# Patient Record
Sex: MALE | Race: ASIAN | NOT HISPANIC OR LATINO | Employment: STUDENT | URBAN - METROPOLITAN AREA
[De-identification: names, ages, dates, MRNs, and addresses within clinical notes are randomized per-mention and may not be internally consistent; named-entity substitution may affect disease eponyms.]

---
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
## 2020-12-28 ENCOUNTER — OFFICE VISIT (OUTPATIENT)
Dept: PEDIATRICS CLINIC | Age: 20
End: 2020-12-28
Payer: COMMERCIAL

## 2020-12-28 VITALS
RESPIRATION RATE: 16 BRPM | HEIGHT: 72 IN | HEART RATE: 72 BPM | DIASTOLIC BLOOD PRESSURE: 72 MMHG | WEIGHT: 182 LBS | SYSTOLIC BLOOD PRESSURE: 110 MMHG | TEMPERATURE: 97.9 F | BODY MASS INDEX: 24.65 KG/M2

## 2020-12-28 DIAGNOSIS — Z00.00 WELL ADULT HEALTH CHECK: Primary | ICD-10-CM

## 2020-12-28 PROCEDURE — 99395 PREV VISIT EST AGE 18-39: CPT | Performed by: PEDIATRICS

## 2020-12-28 PROCEDURE — 99173 VISUAL ACUITY SCREEN: CPT | Performed by: PEDIATRICS

## 2021-01-13 LAB
ALBUMIN SERPL-MCNC: 4.2 G/DL (ref 3.6–5.1)
ALBUMIN/GLOB SERPL: 1.6 (CALC) (ref 1–2.5)
ALP SERPL-CCNC: 69 U/L (ref 36–130)
ALT SERPL-CCNC: 22 U/L (ref 9–46)
AST SERPL-CCNC: 21 U/L (ref 10–40)
BASOPHILS # BLD AUTO: 72 CELLS/UL (ref 0–200)
BASOPHILS NFR BLD AUTO: 1.3 %
BILIRUB SERPL-MCNC: 1 MG/DL (ref 0.2–1.2)
BLASTS # BLD: NORMAL CELLS/UL
BLASTS NFR BLD MANUAL: NORMAL %
BUN SERPL-MCNC: 12 MG/DL (ref 7–25)
BUN/CREAT SERPL: NORMAL (CALC) (ref 6–22)
CALCIUM SERPL-MCNC: 9.3 MG/DL (ref 8.6–10.3)
CHLORIDE SERPL-SCNC: 103 MMOL/L (ref 98–110)
CHOLEST SERPL-MCNC: 173 MG/DL
CHOLEST/HDLC SERPL: 2.6 (CALC)
CO2 SERPL-SCNC: 28 MMOL/L (ref 20–32)
CREAT SERPL-MCNC: 0.91 MG/DL (ref 0.6–1.35)
EOSINOPHIL # BLD AUTO: 242 CELLS/UL (ref 15–500)
EOSINOPHIL NFR BLD AUTO: 4.4 %
ERYTHROCYTE [DISTWIDTH] IN BLOOD BY AUTOMATED COUNT: 12.2 % (ref 11–15)
GLOBULIN SER CALC-MCNC: 2.7 G/DL (CALC) (ref 1.9–3.7)
GLUCOSE SERPL-MCNC: 85 MG/DL (ref 65–99)
HCT VFR BLD AUTO: 44.3 % (ref 38.5–50)
HDLC SERPL-MCNC: 67 MG/DL
HGB BLD-MCNC: 14.6 G/DL (ref 13.2–17.1)
LDLC SERPL CALC-MCNC: 82 MG/DL (CALC)
LYMPHOCYTES # BLD AUTO: 1694 CELLS/UL (ref 850–3900)
LYMPHOCYTES NFR BLD AUTO: 30.8 %
MCH RBC QN AUTO: 28.8 PG (ref 27–33)
MCHC RBC AUTO-ENTMCNC: 33 G/DL (ref 32–36)
MCV RBC AUTO: 87.4 FL (ref 80–100)
METAMYELOCYTES # BLD: NORMAL CELLS/UL
METAMYELOCYTES NFR BLD MANUAL: NORMAL %
MONOCYTES # BLD AUTO: 567 CELLS/UL (ref 200–950)
MONOCYTES NFR BLD AUTO: 10.3 %
MYELOCYTES # BLD: NORMAL CELLS/UL
MYELOCYTES NFR BLD MANUAL: NORMAL %
NEUTROPHILS # BLD AUTO: 2926 CELLS/UL (ref 1500–7800)
NEUTROPHILS NFR BLD AUTO: 53.2 %
NEUTS BAND # BLD: NORMAL CELLS/UL (ref 0–750)
NEUTS BAND NFR BLD MANUAL: NORMAL %
NONHDLC SERPL-MCNC: 106 MG/DL (CALC)
NRBC # BLD: NORMAL CELLS/UL
NRBC BLD-RTO: NORMAL /100 WBC
PLATELET # BLD AUTO: 254 THOUSAND/UL (ref 140–400)
PMV BLD REES-ECKER: 10.5 FL (ref 7.5–12.5)
POTASSIUM SERPL-SCNC: 4 MMOL/L (ref 3.5–5.3)
PROMYELOCYTES # BLD: NORMAL CELLS/UL
PROMYELOCYTES NFR BLD MANUAL: NORMAL %
PROT SERPL-MCNC: 6.9 G/DL (ref 6.1–8.1)
RBC # BLD AUTO: 5.07 MILLION/UL (ref 4.2–5.8)
SERVICE CMNT-IMP: NORMAL
SL AMB EGFR AFRICAN AMERICAN: 140 ML/MIN/1.73M2
SL AMB EGFR NON AFRICAN AMERICAN: 121 ML/MIN/1.73M2
SODIUM SERPL-SCNC: 138 MMOL/L (ref 135–146)
TRIGL SERPL-MCNC: 137 MG/DL
VARIANT LYMPHS NFR BLD: NORMAL % (ref 0–10)
WBC # BLD AUTO: 5.5 THOUSAND/UL (ref 3.8–10.8)

## 2021-04-12 DIAGNOSIS — Z23 ENCOUNTER FOR IMMUNIZATION: ICD-10-CM

## 2021-05-24 ENCOUNTER — IMMUNIZATIONS (OUTPATIENT)
Dept: FAMILY MEDICINE CLINIC | Facility: HOSPITAL | Age: 21
End: 2021-05-24

## 2021-05-24 DIAGNOSIS — Z23 ENCOUNTER FOR IMMUNIZATION: Primary | ICD-10-CM

## 2021-05-24 PROCEDURE — 91300 SARS-COV-2 / COVID-19 MRNA VACCINE (PFIZER-BIONTECH) 30 MCG: CPT

## 2021-05-24 PROCEDURE — 0001A SARS-COV-2 / COVID-19 MRNA VACCINE (PFIZER-BIONTECH) 30 MCG: CPT

## 2021-12-21 DIAGNOSIS — Z20.822 EXPOSURE TO COVID-19 VIRUS: Primary | ICD-10-CM

## 2021-12-23 PROCEDURE — U0003 INFECTIOUS AGENT DETECTION BY NUCLEIC ACID (DNA OR RNA); SEVERE ACUTE RESPIRATORY SYNDROME CORONAVIRUS 2 (SARS-COV-2) (CORONAVIRUS DISEASE [COVID-19]), AMPLIFIED PROBE TECHNIQUE, MAKING USE OF HIGH THROUGHPUT TECHNOLOGIES AS DESCRIBED BY CMS-2020-01-R: HCPCS | Performed by: FAMILY MEDICINE

## 2021-12-23 PROCEDURE — U0005 INFEC AGEN DETEC AMPLI PROBE: HCPCS | Performed by: FAMILY MEDICINE

## 2022-03-10 ENCOUNTER — OFFICE VISIT (OUTPATIENT)
Dept: FAMILY MEDICINE CLINIC | Facility: CLINIC | Age: 22
End: 2022-03-10
Payer: COMMERCIAL

## 2022-03-10 VITALS
HEIGHT: 72 IN | RESPIRATION RATE: 16 BRPM | DIASTOLIC BLOOD PRESSURE: 68 MMHG | SYSTOLIC BLOOD PRESSURE: 116 MMHG | BODY MASS INDEX: 23.16 KG/M2 | WEIGHT: 171 LBS | OXYGEN SATURATION: 98 % | HEART RATE: 74 BPM

## 2022-03-10 DIAGNOSIS — Z23 NEED FOR PROPHYLACTIC VACCINATION AGAINST DIPHTHERIA AND TETANUS: ICD-10-CM

## 2022-03-10 DIAGNOSIS — I47.1 SVT (SUPRAVENTRICULAR TACHYCARDIA) (HCC): ICD-10-CM

## 2022-03-10 DIAGNOSIS — Z00.00 PHYSICAL EXAM, ANNUAL: Primary | ICD-10-CM

## 2022-03-10 PROCEDURE — 90715 TDAP VACCINE 7 YRS/> IM: CPT | Performed by: FAMILY MEDICINE

## 2022-03-10 PROCEDURE — 99395 PREV VISIT EST AGE 18-39: CPT | Performed by: FAMILY MEDICINE

## 2022-03-10 PROCEDURE — 3725F SCREEN DEPRESSION PERFORMED: CPT | Performed by: FAMILY MEDICINE

## 2022-03-10 PROCEDURE — 3008F BODY MASS INDEX DOCD: CPT | Performed by: FAMILY MEDICINE

## 2022-03-10 PROCEDURE — 90471 IMMUNIZATION ADMIN: CPT | Performed by: FAMILY MEDICINE

## 2022-03-10 NOTE — PROGRESS NOTES
Subjective:      Patient ID: Jesusita Nicole is a 24 y o  male  75-year-old male presents for annual physical examination  Patient does have history of SVT  He recently saw pediatric cardiologist in follow-up  They are contemplating going to see electrophysiologist and having ablation performed  He does occasionally developed episodes which she does note when he is jumping  When this occurs he is able to feel it and he will perform Valsalva maneuver and SVT will break  Juan year at [employer]  No other complaints or concerns      No past medical history on file  Family History   Problem Relation Age of Onset    Stroke Other     Heart attack Other     Rectal cancer Other        No past surgical history on file  reports that he has never smoked  He has never used smokeless tobacco  He reports that he does not drink alcohol and does not use drugs  Current Outpatient Medications:     atenolol (TENORMIN) 25 mg tablet, Take 1 tablet by mouth daily, Disp: , Rfl:     triamcinolone (KENALOG) 0 1 % cream, Apply topically daily For 1 week (Patient not taking: Reported on 3/10/2022 ), Disp: 30 g, Rfl: 0    The following portions of the patient's history were reviewed and updated as appropriate: allergies, current medications, past family history, past medical history, past social history, past surgical history and problem list     Review of Systems   Constitutional: Negative  HENT: Negative  Eyes: Negative  Respiratory: Negative  Cardiovascular: Positive for palpitations (Sporadic episodes of SVT)  Gastrointestinal: Negative  Endocrine: Negative  Genitourinary: Negative  Musculoskeletal: Negative  Skin: Negative  Allergic/Immunologic: Negative  Neurological: Negative  Hematological: Negative  Psychiatric/Behavioral: Negative  All other systems reviewed and are negative            Objective:    /68   Pulse 74   Resp 16   Ht 6' (1 829 m) Wt 77 6 kg (171 lb)   SpO2 98%   BMI 23 19 kg/m²      Physical Exam  Vitals and nursing note reviewed  Constitutional:       General: He is not in acute distress  Appearance: Normal appearance  He is well-developed and normal weight  He is not ill-appearing  HENT:      Head: Normocephalic and atraumatic  Right Ear: Tympanic membrane, ear canal and external ear normal       Left Ear: Tympanic membrane, ear canal and external ear normal    Eyes:      Extraocular Movements: Extraocular movements intact  Conjunctiva/sclera: Conjunctivae normal       Pupils: Pupils are equal, round, and reactive to light  Cardiovascular:      Rate and Rhythm: Normal rate and regular rhythm  Pulses: Normal pulses  Heart sounds: Normal heart sounds  No murmur heard  Pulmonary:      Effort: Pulmonary effort is normal       Breath sounds: Normal breath sounds  Abdominal:      General: Abdomen is flat  Bowel sounds are normal       Palpations: Abdomen is soft  Hernia: No hernia is present  Genitourinary:     Penis: Normal        Testes: Normal    Musculoskeletal:         General: Normal range of motion  Cervical back: Normal range of motion and neck supple  Skin:     General: Skin is warm and dry  Neurological:      General: No focal deficit present  Mental Status: He is alert and oriented to person, place, and time  Psychiatric:         Mood and Affect: Mood normal          Behavior: Behavior normal          Thought Content: Thought content normal          Judgment: Judgment normal            No results found for this or any previous visit (from the past 1008 hour(s))  Assessment/Plan:    SVT (supraventricular tachycardia) [facility])  Patient did recently see his pediatric cardiologist   Pediatric cardiologist had discussed with patient and his family about performing ablation  Patient does gets sporadic episodes of SVT which he is able to break on his own with Valsalva maneuver  He does take atenolol on a daily basis    I did print off information for electrophysiologist here at [facility], Dr Dereje Baeza    Physical exam, annual  Annual physical examination performed    -as cell booster provided    -patient did have blood work performed in 2021 which included his cholesterol which was perfectly normal    Need for prophylactic vaccination against diphtheria and tetanus  Adacel provided here in the office          Problem List Items Addressed This Visit        Cardiovascular and Mediastinum    SVT (supraventricular tachycardia) [facility])     Patient did recently see his pediatric cardiologist   Pediatric cardiologist had discussed with patient and his family about performing ablation  Patient does gets sporadic episodes of SVT which he is able to break on his own with Valsalva maneuver    He does take atenolol on a daily basis    I did print off information for electrophysiologist here at [facility], Dr Dereje Baeza            Other    Need for prophylactic vaccination against diphtheria and tetanus     Adacel provided here in the office         Relevant Orders    TDAP VACCINE GREATER THAN OR EQUAL TO 6YO IM (Completed)    Physical exam, annual - Primary     Annual physical examination performed    -as cell booster provided    -patient did have blood work performed in 2021 which included his cholesterol which was perfectly normal

## 2022-03-10 NOTE — ASSESSMENT & PLAN NOTE
Patient did recently see his pediatric cardiologist   Pediatric cardiologist had discussed with patient and his family about performing ablation  Patient does gets sporadic episodes of SVT which he is able to break on his own with Valsalva maneuver    He does take atenolol on a daily basis    I did print off information for electrophysiologist here at [address], Dr Rey Quintero

## 2022-03-10 NOTE — ASSESSMENT & PLAN NOTE
Annual physical examination performed    -as cell booster provided    -patient did have blood work performed in 2021 which included his cholesterol which was perfectly normal

## 2023-01-03 ENCOUNTER — OFFICE VISIT (OUTPATIENT)
Dept: FAMILY MEDICINE CLINIC | Facility: CLINIC | Age: 23
End: 2023-01-03

## 2023-01-03 VITALS
WEIGHT: 177 LBS | OXYGEN SATURATION: 97 % | RESPIRATION RATE: 16 BRPM | SYSTOLIC BLOOD PRESSURE: 124 MMHG | HEART RATE: 78 BPM | DIASTOLIC BLOOD PRESSURE: 68 MMHG | HEIGHT: 72 IN | BODY MASS INDEX: 23.98 KG/M2 | TEMPERATURE: 97.8 F

## 2023-01-03 DIAGNOSIS — I47.1 SVT (SUPRAVENTRICULAR TACHYCARDIA) (HCC): ICD-10-CM

## 2023-01-03 DIAGNOSIS — J20.8 ACUTE BRONCHITIS DUE TO OTHER SPECIFIED ORGANISMS: Primary | ICD-10-CM

## 2023-01-03 PROBLEM — R06.2 EXPIRATORY WHEEZING: Status: ACTIVE | Noted: 2023-01-03

## 2023-01-03 RX ORDER — AZITHROMYCIN 250 MG/1
TABLET, FILM COATED ORAL
Qty: 6 TABLET | Refills: 0 | Status: SHIPPED | OUTPATIENT
Start: 2023-01-03 | End: 2023-01-07

## 2023-01-03 RX ORDER — ALBUTEROL SULFATE 90 UG/1
2 AEROSOL, METERED RESPIRATORY (INHALATION) EVERY 6 HOURS PRN
Qty: 18 G | Refills: 0 | Status: SHIPPED | OUTPATIENT
Start: 2023-01-03

## 2023-01-03 RX ORDER — PREDNISONE 20 MG/1
40 TABLET ORAL DAILY
Qty: 10 TABLET | Refills: 0 | Status: SHIPPED | OUTPATIENT
Start: 2023-01-03 | End: 2023-01-08

## 2023-01-03 NOTE — ASSESSMENT & PLAN NOTE
Patient has seen pediatric cardiologist   They have had discussions about performing ablation  He is able to break his episodes of SVT on his own with Valsalva maneuver    He does take atenolol on a daily basis

## 2023-01-03 NOTE — ASSESSMENT & PLAN NOTE
Likely had a viral bronchitis however the [facility] at the end of the week    -Patient will be treated with a course of Zithromax as well as pulse course prednisone 40 mg daily    -I did also prescribe albuterol inhaler    I did make patient aware on how to use an inhaler and I did warn him that there is possibility that this could precipitate episodes of SVT however he is on beta-blocker

## 2023-01-03 NOTE — PROGRESS NOTES
Subjective:      Patient ID: Lloyd Briggs is a 25 y o  male  57-year-old male presents complaining of a 2-week history of chest congestion, cough, wheezing  Cough is productive of greenish sputum  Patient has been taking over-the-counter Mucinex  He thinks that he may have had a fever for 1 day  No COVID-19 testing  Did not seek medical care anywhere else  States that his symptoms started after he got on break from the [address]  He will be returning back to [employer] [employer] [facility]  for classes starting this weekend      No past medical history on file  Family History   Problem Relation Age of Onset   • Stroke Other    • Heart attack Other    • Rectal cancer Other        No past surgical history on file  reports that he has never smoked  He has never used smokeless tobacco  He reports that he does not drink alcohol and does not use drugs  Current Outpatient Medications:   •  albuterol (Ventolin HFA) 90 mcg/act inhaler, Inhale 2 puffs every 6 (six) hours as needed for wheezing, Disp: 18 g, Rfl: 0  •  atenolol (TENORMIN) 25 mg tablet, Take 1 tablet by mouth daily, Disp: , Rfl:   •  azithromycin (ZITHROMAX) 250 mg tablet, Take 2 tablets today then 1 tablet daily x 4 days, Disp: 6 tablet, Rfl: 0  •  predniSONE 20 mg tablet, Take 2 tablets (40 mg total) by mouth daily for 5 days, Disp: 10 tablet, Rfl: 0  •  triamcinolone (KENALOG) 0 1 % cream, Apply topically daily For 1 week (Patient not taking: Reported on 3/10/2022), Disp: 30 g, Rfl: 0    The following portions of the patient's history were reviewed and updated as appropriate: allergies, current medications, past family history, past medical history, past social history, past surgical history and problem list     Review of Systems   Constitutional: Negative  Negative for chills, fatigue and fever  HENT: Negative for congestion and sore throat  Respiratory: Positive for cough and wheezing   Negative for choking, chest tightness, shortness of breath and stridor  Cardiovascular: Negative  Gastrointestinal: Negative  Objective:    /68   Pulse 78   Temp 97 8 °F (36 6 °C) (Tympanic Core)   Resp 16   Ht 6' (1 829 m)   Wt 80 3 kg (177 lb)   SpO2 97%   BMI 24 01 kg/m²      Physical Exam  Vitals and nursing note reviewed  Constitutional:       General: He is not in acute distress  Appearance: Normal appearance  He is well-developed and normal weight  He is not ill-appearing or toxic-appearing  HENT:      Head: Normocephalic and atraumatic  Nose: Mucosal edema and congestion present  No rhinorrhea  Cardiovascular:      Rate and Rhythm: Normal rate and regular rhythm  Heart sounds: Normal heart sounds  Pulmonary:      Effort: Pulmonary effort is normal       Breath sounds: Wheezing ( End expiratory) and rhonchi present  Musculoskeletal:      Cervical back: Normal range of motion and neck supple  Skin:     General: Skin is warm and dry  Neurological:      Mental Status: He is alert  No results found for this or any previous visit (from the past 1008 hour(s))  Assessment/Plan:    SVT (supraventricular tachycardia) [facility])  Patient has seen pediatric cardiologist   They have had discussions about performing ablation  He is able to break his episodes of SVT on his own with Valsalva maneuver  He does take atenolol on a daily basis    Acute bronchitis due to other specified organisms  Likely had a viral bronchitis however the patient will be returning to [place] which is 6 hours away at the end of the week    -Patient will be treated with a course of Zithromax as well as pulse course prednisone 40 mg daily    -I did also prescribe albuterol inhaler    I did make patient aware on how to use an inhaler and I did warn him that there is possibility that this could precipitate episodes of SVT however he is on beta-blocker          Problem List Items Addressed This Visit        Respiratory    Acute bronchitis due to other specified organisms - Primary     Likely had a viral bronchitis however the patient will be returning to [facility]  which is 6 hours away at the end of the week    -Patient will be treated with a course of Zithromax as well as pulse course prednisone 40 mg daily    -I did also prescribe albuterol inhaler  I did make patient aware on how to use an inhaler and I did warn him that there is possibility that this could precipitate episodes of SVT however he is on beta-blocker         Relevant Medications    azithromycin (ZITHROMAX) 250 mg tablet    predniSONE 20 mg tablet    albuterol (Ventolin HFA) 90 mcg/act inhaler       Cardiovascular and Mediastinum    SVT (supraventricular tachycardia) [facility])     Patient has seen pediatric cardiologist   They have had discussions about performing ablation  He is able to break his episodes of SVT on his own with Valsalva maneuver    He does take atenolol on a daily basis

## 2023-03-04 PROBLEM — J20.8 ACUTE BRONCHITIS DUE TO OTHER SPECIFIED ORGANISMS: Status: RESOLVED | Noted: 2023-01-03 | Resolved: 2023-03-04

## 2023-12-11 ENCOUNTER — RA CDI HCC (OUTPATIENT)
Dept: OTHER | Facility: HOSPITAL | Age: 23
End: 2023-12-11

## 2023-12-11 NOTE — PROGRESS NOTES
[address] [facility] coding opportunities       Chart reviewed, no opportunity found: CHART REVIEWED, NO OPPORTUNITY FOUND        Patients Insurance        Commercial Insurance: Guy Villafuerte

## 2023-12-18 ENCOUNTER — OFFICE VISIT (OUTPATIENT)
Dept: FAMILY MEDICINE CLINIC | Facility: CLINIC | Age: 23
End: 2023-12-18
Payer: COMMERCIAL

## 2023-12-18 VITALS
SYSTOLIC BLOOD PRESSURE: 124 MMHG | OXYGEN SATURATION: 97 % | HEART RATE: 78 BPM | HEIGHT: 72 IN | WEIGHT: 186 LBS | DIASTOLIC BLOOD PRESSURE: 62 MMHG | BODY MASS INDEX: 25.19 KG/M2

## 2023-12-18 DIAGNOSIS — I47.10 SVT (SUPRAVENTRICULAR TACHYCARDIA): ICD-10-CM

## 2023-12-18 DIAGNOSIS — Z98.890 HISTORY OF CARDIAC RADIOFREQUENCY ABLATION: ICD-10-CM

## 2023-12-18 DIAGNOSIS — Z00.00 PHYSICAL EXAM, ANNUAL: Primary | ICD-10-CM

## 2023-12-18 PROBLEM — R06.2 EXPIRATORY WHEEZING: Status: RESOLVED | Noted: 2023-01-03 | Resolved: 2023-12-18

## 2023-12-18 PROCEDURE — 99395 PREV VISIT EST AGE 18-39: CPT | Performed by: FAMILY MEDICINE

## 2023-12-18 NOTE — ASSESSMENT & PLAN NOTE
Patient had successful ablation for SVT at [facility].  He has followed up with pediatric cardiologist.  No further episodes.  He has been given precautions about watching his intake of caffeine as well as any type of stimulant medication such as pseudoephedrine

## 2023-12-18 NOTE — PROGRESS NOTES
Subjective:      Patient ID: Nash Grayson is a 23 y.o. male.    23-year-old male presents for annual physical examination.  Patient did undergo cardiac ablation in March.  No further episodes of SVT.  He did not follow-up with pediatric cardiologist in May.  He does avoid caffeine.  Still works out.  Working as         No past medical history on file.    Family History   Problem Relation Age of Onset   • Stroke Other    • Heart attack Other    • Rectal cancer Other        No past surgical history on file.     reports that he has never smoked. He has never used smokeless tobacco. He reports that he does not drink alcohol and does not use drugs.      Current Outpatient Medications:   •  albuterol (Ventolin HFA) 90 mcg/act inhaler, Inhale 2 puffs every 6 (six) hours as needed for wheezing (Patient not taking: Reported on 12/18/2023), Disp: 18 g, Rfl: 0  •  triamcinolone (KENALOG) 0.1 % cream, Apply topically daily For 1 week (Patient not taking: Reported on 3/10/2022), Disp: 30 g, Rfl: 0    The following portions of the patient's history were reviewed and updated as appropriate: allergies, current medications, past family history, past medical history, past social history, past surgical history and problem list.    Review of Systems   Constitutional: Negative.    HENT: Negative.     Eyes: Negative.    Respiratory: Negative.     Cardiovascular: Negative.    Gastrointestinal: Negative.    Endocrine: Negative.    Genitourinary: Negative.    Musculoskeletal: Negative.    Skin: Negative.    Allergic/Immunologic: Negative.    Neurological: Negative.    Hematological: Negative.    Psychiatric/Behavioral: Negative.     All other systems reviewed and are negative.          Objective:    /62   Pulse 78   Ht 6' (1.829 m)   Wt 84.4 kg (186 lb)   SpO2 97%   BMI 25.23 kg/m²      Physical Exam  Vitals and nursing note reviewed.   Constitutional:       General: He is not in acute distress.      Appearance: Normal appearance. He is well-developed and normal weight. He is not ill-appearing.   HENT:      Head: Normocephalic and atraumatic.      Right Ear: Tympanic membrane, ear canal and external ear normal.      Left Ear: Tympanic membrane, ear canal and external ear normal.      Nose: Nose normal.      Mouth/Throat:      Mouth: Mucous membranes are moist.   Eyes:      Extraocular Movements: Extraocular movements intact.      Conjunctiva/sclera: Conjunctivae normal.      Pupils: Pupils are equal, round, and reactive to light.   Cardiovascular:      Rate and Rhythm: Normal rate and regular rhythm.      Pulses: Normal pulses.      Heart sounds: Normal heart sounds. No murmur heard.  Pulmonary:      Effort: Pulmonary effort is normal.      Breath sounds: Normal breath sounds.   Abdominal:      General: Abdomen is flat. Bowel sounds are normal.      Palpations: Abdomen is soft.   Genitourinary:     Penis: Normal.       Testes: Normal.   Musculoskeletal:         General: Normal range of motion.      Cervical back: Normal range of motion and neck supple.   Skin:     General: Skin is warm and dry.   Neurological:      General: No focal deficit present.      Mental Status: He is alert and oriented to person, place, and time.   Psychiatric:         Mood and Affect: Mood normal.         Behavior: Behavior normal.         Thought Content: Thought content normal.         Judgment: Judgment normal.           No results found for this or any previous visit (from the past 1008 hour(s)).    Assessment/Plan:    History of cardiac radiofrequency ablation  Patient had successful ablation for SVT at [facility].  He has followed up with pediatric cardiologist.  No further episodes.  He has been given precautions about watching his intake of caffeine as well as any type of stimulant medication such as pseudoephedrine    SVT (supraventricular tachycardia) (HCC)  Patient is no longer taking beta-blocker.  He  underwent ablation at [facility].  He has followed up with pediatric cardiologist    Physical exam, annual  Annual physical examination performed    -Patient did have blood work prior to ablation in March which was essentially normal    -Would not recommend repeating labs until he is 25 years of age     - declines flu vaccination              Problem List Items Addressed This Visit        Cardiovascular and Mediastinum    SVT (supraventricular tachycardia)     Patient is no longer taking beta-blocker.  He underwent ablation at [facility].  He has followed up with pediatric cardiologist            Other    History of cardiac radiofrequency ablation     Patient had successful ablation for SVT at [facility].  He has followed up with pediatric cardiologist.  No further episodes.  He has been given precautions about watching his intake of caffeine as well as any type of stimulant medication such as pseudoephedrine         Physical exam, annual - Primary     Annual physical examination performed    -Patient did have blood work prior to ablation in March which was essentially normal    -Would not recommend repeating labs until he is 25 years of age     - declines flu vaccination

## 2023-12-18 NOTE — ASSESSMENT & PLAN NOTE
Patient is no longer taking beta-blocker.  He underwent ablation at [facility].  He has followed up with pediatric cardiologist

## 2023-12-18 NOTE — ASSESSMENT & PLAN NOTE
Annual physical examination performed    -Patient did have blood work prior to ablation in March which was essentially normal    -Would not recommend repeating labs until he is 25 years of age     - declines flu vaccination